# Patient Record
Sex: MALE | Race: AMERICAN INDIAN OR ALASKA NATIVE
[De-identification: names, ages, dates, MRNs, and addresses within clinical notes are randomized per-mention and may not be internally consistent; named-entity substitution may affect disease eponyms.]

---

## 2017-12-01 ENCOUNTER — HOSPITAL ENCOUNTER (EMERGENCY)
Dept: HOSPITAL 5 - ED | Age: 37
Discharge: HOME | End: 2017-12-01
Payer: COMMERCIAL

## 2017-12-01 VITALS — SYSTOLIC BLOOD PRESSURE: 117 MMHG | DIASTOLIC BLOOD PRESSURE: 87 MMHG

## 2017-12-01 DIAGNOSIS — E11.9: Primary | ICD-10-CM

## 2017-12-01 LAB
ANION GAP SERPL CALC-SCNC: 25 MMOL/L
BASOPHILS NFR BLD AUTO: 0.5 % (ref 0–1.8)
BILIRUB UR QL STRIP: (no result)
BLOOD UR QL VISUAL: (no result)
BUN SERPL-MCNC: 20 MG/DL (ref 9–20)
BUN/CREAT SERPL: 18 %
CALCIUM SERPL-MCNC: 9.6 MG/DL (ref 8.4–10.2)
CHLORIDE SERPL-SCNC: 91.7 MMOL/L (ref 98–107)
CO2 SERPL-SCNC: 20 MMOL/L (ref 22–30)
EOSINOPHIL NFR BLD AUTO: 1.5 % (ref 0–4.3)
GLUCOSE SERPL-MCNC: 445 MG/DL (ref 75–100)
HCT VFR BLD CALC: 50.7 % (ref 35.5–45.6)
HGB BLD-MCNC: 17.1 GM/DL (ref 11.8–15.2)
KETONES UR STRIP-MCNC: 20 MG/DL
LEUKOCYTE ESTERASE UR QL STRIP: (no result)
MCH RBC QN AUTO: 30 PG (ref 28–32)
MCHC RBC AUTO-ENTMCNC: 34 % (ref 32–34)
MCV RBC AUTO: 91 FL (ref 84–94)
MUCOUS THREADS #/AREA URNS HPF: (no result) /HPF
NITRITE UR QL STRIP: (no result)
PH UR STRIP: 5 [PH] (ref 5–7)
PLATELET # BLD: 201 K/MM3 (ref 140–440)
POTASSIUM SERPL-SCNC: 4.5 MMOL/L (ref 3.6–5)
RBC # BLD AUTO: 5.6 M/MM3 (ref 3.65–5.03)
RBC #/AREA URNS HPF: 1 /HPF (ref 0–6)
SODIUM SERPL-SCNC: 132 MMOL/L (ref 137–145)
UROBILINOGEN UR-MCNC: < 2 MG/DL (ref ?–2)
WBC # BLD AUTO: 7.9 K/MM3 (ref 4.5–11)
WBC #/AREA URNS HPF: 1 /HPF (ref 0–6)

## 2017-12-01 PROCEDURE — 85025 COMPLETE CBC W/AUTO DIFF WBC: CPT

## 2017-12-01 PROCEDURE — 82962 GLUCOSE BLOOD TEST: CPT

## 2017-12-01 PROCEDURE — 80048 BASIC METABOLIC PNL TOTAL CA: CPT

## 2017-12-01 PROCEDURE — 36415 COLL VENOUS BLD VENIPUNCTURE: CPT

## 2017-12-01 PROCEDURE — 96361 HYDRATE IV INFUSION ADD-ON: CPT

## 2017-12-01 PROCEDURE — 96374 THER/PROPH/DIAG INJ IV PUSH: CPT

## 2017-12-01 PROCEDURE — 96375 TX/PRO/DX INJ NEW DRUG ADDON: CPT

## 2017-12-01 PROCEDURE — 82805 BLOOD GASES W/O2 SATURATION: CPT

## 2017-12-01 PROCEDURE — 99284 EMERGENCY DEPT VISIT MOD MDM: CPT

## 2017-12-01 PROCEDURE — 81001 URINALYSIS AUTO W/SCOPE: CPT

## 2017-12-01 NOTE — EMERGENCY DEPARTMENT REPORT
HPI





- General


Chief Complaint: Hyperglycemia


Time Seen by Provider: 12/01/17 17:31





- HPI


HPI: 


This is a 37 year-old  male presents to the emergency department

, sent in by his PCP Dr. Barber, with complaint of elevated blood glucose 

without the diagnosis of diabetes previously.  The only Past medical history 

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 

compliantly.  He has been having some increased urination, increased thirst, 

one episode of nausea with vomiting and some generalized fatigue.  No recent 

travel or sick contacts at home.  He has not taken anything for his symptoms 

prior to presentation.








ED Past Medical Hx





- Past Medical History


Additional medical history: DVT 2007,2011





- Surgical History


Past Surgical History?: No


Additional Surgical History: thrombolectomy





- Social History


Smoking Status: Never Smoker


Substance Use Type: None





- Medications


Home Medications: 


 Home Medications











 Medication  Instructions  Recorded  Confirmed  Last Taken  Type


 


Apixaban [Eliquis] 5 mg PO BID 12/01/17 12/01/17 12/01/17 History


 


Ranitidine HCl [Zantac 150 MG TAB] 150 mg PO QDAY PRN 12/01/17 12/01/17 Unknown 

History


 


metFORMIN [Glucophage] 500 mg PO BID #60 tablet 12/01/17  Unknown Rx














ED Review of Systems


ROS: 


Stated complaint: BLOOD SUGAR ELEVATED


Other details as noted in HPI





Comment: All other systems reviewed and negative


Constitutional: other (fatigue).  denies: fever


Eyes: denies: eye pain, eye discharge, vision change


ENT: denies: ear pain, throat pain


Respiratory: denies: cough, shortness of breath, wheezing


Cardiovascular: denies: chest pain, palpitations


Endocrine: increased thirst, increased urine


Gastrointestinal: nausea, vomiting


Genitourinary: frequency.  denies: dysuria


Musculoskeletal: denies: back pain, joint swelling, arthralgia


Skin: denies: rash, lesions


Neurological: denies: headache, weakness, paresthesias





Physical Exam





- Physical Exam


Vital Signs: 


 Vital Signs











  12/01/17





  16:48


 


Temperature 99.2 F


 


Pulse Rate 115 H


 


Respiratory 20





Rate 


 


Blood Pressure 138/88


 


O2 Sat by Pulse 94





Oximetry 











Physical Exam: 


GENERAL: The patient is well-developed well-nourished.


HENT: Normocephalic.  Atraumatic.    Patient has moist mucous membranes.


EYES: Extraocular motions are intact.  Pupils equal reactive to light 

bilaterally.


NECK: Supple.  Trachea is midline.


CHEST/LUNGS: Clear to auscultation.  There is no respiratory distress noted.


HEART/CARDIOVASCULAR: Regular.  There is mild tachycardia.  There is no gallop 

rub or murmur.


ABDOMEN: Abdomen is soft, nontender.  Patient has normal bowel sounds.  There 

is no abdominal distention.  Obese habitus.


SKIN: Skin is warm and dry.


NEURO: The patient is awake, alert, and oriented.  The patient is cooperative.  

The patient has no focal neurologic deficits.  The patient has normal speech.


MUSCULOSKELETAL: There is no tenderness or deformity.  There is no limitation 

range of motion.  There is no evidence of acute injury.








ED Course


 Vital Signs











  12/01/17





  16:48


 


Temperature 99.2 F


 


Pulse Rate 115 H


 


Respiratory 20





Rate 


 


Blood Pressure 138/88


 


O2 Sat by Pulse 94





Oximetry 














ED Medical Decision Making





- Lab Data


Result diagrams: 


 12/01/17 17:01





 12/01/17 17:01





- Medical Decision Making


This patient presented with a blood sugar of about 450 and new-onset diabetes.  

Patient does have a elevation in the anion gap but there is no venous acidosis.

  He does not appear to be in diabetic ketoacidosis.  He was given 1.5 L of IV 

fluid resuscitation and a total of 14 units of regular insulin and his blood 

sugar came down to about 250.  Patient says he is feeling better.  Vital signs 

stable.  I started him on metformin.  We discussed dietary and lifestyle 

changes.  He will follow-up with his primary care doctor in the next few days.  

He will return to the ER with any worsening of symptoms or any acute distress.








- Differential Diagnosis


DKA, HHNK


Critical Care Time: No


Critical care attestation.: 


If time is entered above; I have spent that time in minutes in the direct care 

of this critically ill patient, excluding procedure time.








ED Disposition


Clinical Impression: 


 Diabetes mellitus, new onset





Disposition: DC-01 TO HOME OR SELFCARE


Is pt being admited?: No


Condition: Stable


Instructions:  Diabetes Mellitus Type 2 in Adults (ED)


Additional Instructions: 


Please follow-up with your primary care physician in the next few days.





I have started you on a medication called metformin/Glucophage that is to be 

taken twice daily as a treatment for your diabetes.  Do not skip meals while 

taking this medication as it could cause you to have a low blood sugar which 

can be equally dangerous.  Eat 3 sensible healthy meals.





Please try and stay away from foods that are high in sugar, carbohydrates and 

starches to help with your blood sugar level.





Return to the emergency Department with any worsening of her symptoms or any 

acute distress.


Prescriptions: 


metFORMIN [Glucophage] 500 mg PO BID #60 tablet


Referrals: 


PRIMARY CARE,MD [Primary Care Provider] - 3-5 Days


Time of Disposition: 21:56

## 2018-06-25 ENCOUNTER — HOSPITAL ENCOUNTER (EMERGENCY)
Dept: HOSPITAL 5 - ED | Age: 38
Discharge: LEFT BEFORE DISCHARGE | End: 2018-06-25
Payer: COMMERCIAL

## 2018-06-25 VITALS — SYSTOLIC BLOOD PRESSURE: 140 MMHG | DIASTOLIC BLOOD PRESSURE: 80 MMHG

## 2018-06-25 DIAGNOSIS — R31.9: Primary | ICD-10-CM

## 2018-06-25 DIAGNOSIS — Z53.21: ICD-10-CM

## 2018-06-25 LAB
APTT BLD: 25.9 SEC. (ref 24.2–36.6)
BILIRUB UR QL STRIP: (no result)
BLOOD UR QL VISUAL: (no result)
BUN SERPL-MCNC: 16 MG/DL (ref 9–20)
BUN/CREAT SERPL: 15 %
CALCIUM SERPL-MCNC: 9.4 MG/DL (ref 8.4–10.2)
HCT VFR BLD CALC: 46.8 % (ref 35.5–45.6)
HEMOLYSIS INDEX: 6
HGB BLD-MCNC: 15.2 GM/DL (ref 11.8–15.2)
INR PPP: 0.95 (ref 0.87–1.13)
MCH RBC QN AUTO: 29 PG (ref 28–32)
MCHC RBC AUTO-ENTMCNC: 33 % (ref 32–34)
MCV RBC AUTO: 90 FL (ref 84–94)
MUCOUS THREADS #/AREA URNS HPF: (no result) /HPF
PH UR STRIP: 5 [PH] (ref 5–7)
PLATELET # BLD: 245 K/MM3 (ref 140–440)
PROT UR STRIP-MCNC: (no result) MG/DL
RBC # BLD AUTO: 5.19 M/MM3 (ref 3.65–5.03)
RBC #/AREA URNS HPF: 11 /HPF (ref 0–6)
UROBILINOGEN UR-MCNC: < 2 MG/DL (ref ?–2)
WBC #/AREA URNS HPF: < 1 /HPF (ref 0–6)

## 2018-06-25 PROCEDURE — 36415 COLL VENOUS BLD VENIPUNCTURE: CPT

## 2018-06-25 PROCEDURE — 81001 URINALYSIS AUTO W/SCOPE: CPT

## 2018-06-25 PROCEDURE — 80048 BASIC METABOLIC PNL TOTAL CA: CPT

## 2018-06-25 PROCEDURE — 85730 THROMBOPLASTIN TIME PARTIAL: CPT

## 2018-06-25 PROCEDURE — 85027 COMPLETE CBC AUTOMATED: CPT

## 2018-06-25 PROCEDURE — 85610 PROTHROMBIN TIME: CPT
